# Patient Record
Sex: MALE | Race: BLACK OR AFRICAN AMERICAN | Employment: FULL TIME | URBAN - METROPOLITAN AREA
[De-identification: names, ages, dates, MRNs, and addresses within clinical notes are randomized per-mention and may not be internally consistent; named-entity substitution may affect disease eponyms.]

---

## 2017-01-06 VITALS — HEIGHT: 69 IN | WEIGHT: 173 LBS | BODY MASS INDEX: 25.62 KG/M2

## 2017-01-06 NOTE — PERIOP NOTES
Patient verified name, , and surgery as listed in [facility]. Type 1B surgery, phone assessment complete. Orders not received. Labs per surgeon: unknown; no orders received. Labs per anesthesia protocol: none needed per [facility] protocols. EKG: not needed per [facility] protocols. Patient was driving and rushing through assessment. Offered to call back at a more convenient time but patient said he wasn't going to have any other time to talk. Patient states he will try to call back and go over pre-op instructions when he can write things down. Call back number given. Called Dr. Erika Meyers office (pulmonologist) for most recent office note. [phone]. Patient answered medical/surgical history questions at their best of ability. All prior to admission medications documented in [facility]. Patient instructed to take the following medications the day of surgery according to anesthesia guidelines with a small sip of water: nexium, eye drops, spiriva inhaler. Instructed to bring inhaler dos. Hold all vitamins 7 days prior to surgery and NSAIDS 5 days prior to surgery. Medications to be held: none. Patient instructed on the following and verbalizes understanding:  Arrive at [place], time of arrival to be called the day before by 1700  NPO after midnight including gum, mints, and ice chips  Responsible adult must drive patient to the hospital, stay during surgery, and patient will need supervision 24 hours after anesthesia  Use antibacterial soap and hibiclens in shower the night before surgery and on the morning of surgery  Leave all valuables(money and jewelry) at home but bring insurance card and ID on DOS  Do not wear make-up, nail polish, lotions, cologne, perfumes, powders, or oil on skin.

## 2017-01-09 ENCOUNTER — HOSPITAL ENCOUNTER (OUTPATIENT)
Dept: SURGERY | Age: 52
Discharge: HOME | End: 2017-01-09

## 2017-01-11 PROBLEM — K40.20 BILATERAL INGUINAL HERNIA WITHOUT OBSTRUCTION OR GANGRENE: Status: ACTIVE | Noted: 2017-01-11

## 2017-01-12 NOTE — PERIOP NOTES
Spoke with patient and reviewed the following Pre Op instructions. Patient verified name, , and surgery as listed in [facility]. Patient instructed to take the following medications the day of surgery according to anesthesia guidelines with a small sip of water: Nexium, eye drops, spiriva inhaler. Hold all vitamins 7 days prior to surgery and NSAIDS 5 days prior to surgery. Medications to be held: None. Patient instructed on the following and verbalizes understanding:  Arrive at [facility], time of arrival to be called the day before by 1700  NPO after midnight including gum, mints, and ice chips  Responsible adult must drive patient to the hospital, stay during surgery, and patient will  need supervision 24 hours after anesthesia  Use Hibiclens in shower the night before surgery and on the morning of surgery  Leave all valuables(money and jewelry) at home but bring insurance card and ID on DOS  Do not wear make-up, nail polish, lotions, cologne, perfumes, powders, or oil on skin.

## 2017-01-12 NOTE — PERIOP NOTES
Still waiting on medical records to be faxed from [facility]. Called again ([phone]); left detailed message requesting most recent office note be faxed to pre assessment for anesthesia to review pre op.

## 2017-01-15 ENCOUNTER — ANESTHESIA EVENT (OUTPATIENT)
Dept: SURGERY | Age: 52
End: 2017-01-15
Payer: COMMERCIAL

## 2017-01-16 ENCOUNTER — ANESTHESIA (OUTPATIENT)
Dept: SURGERY | Age: 52
End: 2017-01-16
Payer: COMMERCIAL

## 2017-01-16 ENCOUNTER — SURGERY (OUTPATIENT)
Age: 52
End: 2017-01-16

## 2017-01-16 PROCEDURE — 74011250636 HC RX REV CODE- 250/636

## 2017-01-16 PROCEDURE — 77030008477 HC STYL SATN SLP COVD -A: Performed by: ANESTHESIOLOGY

## 2017-01-16 PROCEDURE — 74011250636 HC RX REV CODE- 250/636: Performed by: ANESTHESIOLOGY

## 2017-01-16 PROCEDURE — 77030020782 HC GWN BAIR PAWS FLX 3M -B: Performed by: ANESTHESIOLOGY

## 2017-01-16 PROCEDURE — 77030008703 HC TU ET UNCUF COVD -A: Performed by: ANESTHESIOLOGY

## 2017-01-16 PROCEDURE — 74011000250 HC RX REV CODE- 250

## 2017-01-16 RX ORDER — LIDOCAINE HYDROCHLORIDE 20 MG/ML
INJECTION, SOLUTION EPIDURAL; INFILTRATION; INTRACAUDAL; PERINEURAL AS NEEDED
Status: DISCONTINUED | OUTPATIENT
Start: 2017-01-16 | End: 2017-01-16 | Stop reason: HOSPADM

## 2017-01-16 RX ORDER — FENTANYL CITRATE 50 UG/ML
INJECTION, SOLUTION INTRAMUSCULAR; INTRAVENOUS AS NEEDED
Status: DISCONTINUED | OUTPATIENT
Start: 2017-01-16 | End: 2017-01-16 | Stop reason: HOSPADM

## 2017-01-16 RX ORDER — ONDANSETRON 2 MG/ML
INJECTION INTRAMUSCULAR; INTRAVENOUS AS NEEDED
Status: DISCONTINUED | OUTPATIENT
Start: 2017-01-16 | End: 2017-01-16 | Stop reason: HOSPADM

## 2017-01-16 RX ORDER — DEXAMETHASONE SODIUM PHOSPHATE 4 MG/ML
INJECTION, SOLUTION INTRA-ARTICULAR; INTRALESIONAL; INTRAMUSCULAR; INTRAVENOUS; SOFT TISSUE AS NEEDED
Status: DISCONTINUED | OUTPATIENT
Start: 2017-01-16 | End: 2017-01-16 | Stop reason: HOSPADM

## 2017-01-16 RX ORDER — ROCURONIUM BROMIDE 10 MG/ML
INJECTION, SOLUTION INTRAVENOUS AS NEEDED
Status: DISCONTINUED | OUTPATIENT
Start: 2017-01-16 | End: 2017-01-16 | Stop reason: HOSPADM

## 2017-01-16 RX ORDER — PROPOFOL 10 MG/ML
INJECTION, EMULSION INTRAVENOUS AS NEEDED
Status: DISCONTINUED | OUTPATIENT
Start: 2017-01-16 | End: 2017-01-16 | Stop reason: HOSPADM

## 2017-01-16 RX ORDER — SUCCINYLCHOLINE CHLORIDE 20 MG/ML
INJECTION INTRAMUSCULAR; INTRAVENOUS AS NEEDED
Status: DISCONTINUED | OUTPATIENT
Start: 2017-01-16 | End: 2017-01-16 | Stop reason: HOSPADM

## 2017-01-16 RX ADMIN — PROPOFOL 200 MG: 10 INJECTION, EMULSION INTRAVENOUS at 10:28

## 2017-01-16 RX ADMIN — ROCURONIUM BROMIDE 5 MG: 10 INJECTION, SOLUTION INTRAVENOUS at 10:28

## 2017-01-16 RX ADMIN — OXYMETAZOLINE HYDROCHLORIDE 3 SPRAY: 5 SPRAY NASAL at 11:13

## 2017-01-16 RX ADMIN — LIDOCAINE HYDROCHLORIDE 60 MG: 20 INJECTION, SOLUTION EPIDURAL; INFILTRATION; INTRACAUDAL; PERINEURAL at 10:28

## 2017-01-16 RX ADMIN — SUCCINYLCHOLINE CHLORIDE 140 MG: 20 INJECTION INTRAMUSCULAR; INTRAVENOUS at 10:28

## 2017-01-16 RX ADMIN — LIDOCAINE HYDROCHLORIDE AND EPINEPHRINE 5 ML: 10; 10 INJECTION, SOLUTION INFILTRATION; PERINEURAL at 11:13

## 2017-01-16 RX ADMIN — DEXAMETHASONE SODIUM PHOSPHATE 10 MG: 4 INJECTION, SOLUTION INTRA-ARTICULAR; INTRALESIONAL; INTRAMUSCULAR; INTRAVENOUS; SOFT TISSUE at 10:34

## 2017-01-16 RX ADMIN — ONDANSETRON 4 MG: 2 INJECTION INTRAMUSCULAR; INTRAVENOUS at 10:34

## 2017-01-16 RX ADMIN — SODIUM CHLORIDE, SODIUM LACTATE, POTASSIUM CHLORIDE, AND CALCIUM CHLORIDE: 600; 310; 30; 20 INJECTION, SOLUTION INTRAVENOUS at 10:40

## 2017-01-16 RX ADMIN — FENTANYL CITRATE 100 MCG: 50 INJECTION, SOLUTION INTRAMUSCULAR; INTRAVENOUS at 10:28

## 2017-01-16 NOTE — ANESTHESIA PREPROCEDURE EVALUATION
Anesthetic History   No history of anesthetic complications            Review of Systems / Medical History  Patient summary reviewed and pertinent labs reviewed    Pulmonary    COPD: mild      Smoker (25 pk years)         Neuro/Psych   Within defined limits           Cardiovascular                  Exercise tolerance: >4 METS  Comments: Denies CP, SOB or changes in functional status   GI/Hepatic/Renal     GERD: well controlled           Endo/Other        Arthritis     Other Findings              Physical Exam    Airway  Mallampati: II  TM Distance: 4 - 6 cm  Neck ROM: normal range of motion   Mouth opening: Normal     Cardiovascular    Rhythm: regular  Rate: normal         Dental    Dentition: Caps/crowns     Pulmonary  Breath sounds clear to auscultation               Abdominal  GI exam deferred       Other Findings            Anesthetic Plan    ASA: 2  Anesthesia type: general          Induction: Intravenous  Anesthetic plan and risks discussed with: Patient

## 2017-01-16 NOTE — ANESTHESIA POSTPROCEDURE EVALUATION
Post-Anesthesia Evaluation and Assessment    Patient: Aleena Bliss MRN: [id number]  SSN: [id number]    YOB: 1965  Age: 46 y.o. Sex: male       Cardiovascular Function/Vital Signs  Visit Vitals    /86    Pulse 66    Temp 36.8 °C (98.2 °F)    Resp 16    Ht 5' 9\" (1.753 m)    Wt 78.5 kg (173 lb)    SpO2 94%    BMI 25.55 kg/m2       Patient is status post general anesthesia for Procedure(s):  BILATERAL MAXILLARY/ BILATERAL FRONTAL/ BILATERAL SPENOID BALLOON SINUPLASTY WITH REMOVAL OF CONTENTS/ RIGHT NASAL POLYPECTOMY. Nausea/Vomiting: None    Postoperative hydration reviewed and adequate. Pain:  Pain Scale 1: Visual (01/16/17 1138)  Pain Intensity 1: 4 (01/16/17 1138)   Managed    Neurological Status:   Neuro (WDL): Within Defined Limits (01/16/17 0744)   At baseline    Mental Status and Level of Consciousness: Arousable    Pulmonary Status:   O2 Device: Room air (01/16/17 1152)   Adequate oxygenation and airway patent    Complications related to anesthesia: None    Post-anesthesia assessment completed.  No concerns    Signed By: Adarsh Coley MD     January 16, 2017

## 2017-03-01 ENCOUNTER — HOSPITAL ENCOUNTER (OUTPATIENT)
Dept: ULTRASOUND IMAGING | Age: 52
Discharge: HOME | End: 2017-03-01
Attending: NURSE PRACTITIONER
Payer: COMMERCIAL

## 2017-03-01 DIAGNOSIS — N43.3 HYDROCELE, RIGHT: ICD-10-CM

## 2017-03-01 DIAGNOSIS — N50.89 TESTICULAR SWELLING, RIGHT: ICD-10-CM

## 2017-03-01 PROCEDURE — 76870 US EXAM SCROTUM: CPT

## 2017-03-15 ENCOUNTER — HOSPITAL ENCOUNTER (OUTPATIENT)
Dept: SURGERY | Age: 52
Discharge: HOME | End: 2017-03-15

## 2017-03-15 VITALS
BODY MASS INDEX: 26.58 KG/M2 | WEIGHT: 179.44 LBS | SYSTOLIC BLOOD PRESSURE: 134 MMHG | RESPIRATION RATE: 16 BRPM | OXYGEN SATURATION: 97 % | HEIGHT: 69 IN | HEART RATE: 58 BPM | DIASTOLIC BLOOD PRESSURE: 90 MMHG | TEMPERATURE: 97.8 F

## 2017-03-15 PROBLEM — N43.3 HYDROCELE: Status: ACTIVE | Noted: 2017-03-15

## 2017-03-15 RX ORDER — ROSUVASTATIN CALCIUM 20 MG/1
20 TABLET, COATED ORAL DAILY
COMMUNITY
End: 2018-04-16 | Stop reason: SDUPTHER

## 2017-03-15 RX ORDER — HYDROCORTISONE 10 MG/1
5 TABLET ORAL EVERY EVENING
COMMUNITY

## 2017-03-15 RX ORDER — HYDROCORTISONE 10 MG/1
15 TABLET ORAL DAILY
COMMUNITY

## 2017-03-15 NOTE — PERIOP NOTES
Patient verified name, , and surgery as listed in [facility]. TYPE  CASE:1B  Orders per surgeon:  Received  Labs per surgeon:none  Labs per anesthesia protocol: none  EKG  : Attempting to obtain endocrinology records      Patient provided with handouts including guide to surgery , transfusions, pain management and hand hygiene for the family and community. Pt verbalizes understanding of all pre-op instructions . Instructed that family must be present in building at all times. Hibiclens and instructions given per hospital policy. Instructed patient to continue  previous medications as prescribed prior to surgery and  to take the following medications the day of surgery according to anesthesia guidelines : Crestor,Nexium,Cortef       Original medication prescription bottles where not visualized during patient appointment. Medication profile updated and reviewed with patient. Continue all previous medications unless otherwise directed. Instructed patient to hold  the following medications prior to surgery: none      Patient verbalized understanding of all instructions and provided all medical/health information to the best of their ability.

## 2017-03-20 ENCOUNTER — ANESTHESIA EVENT (OUTPATIENT)
Dept: SURGERY | Age: 52
End: 2017-03-20
Payer: COMMERCIAL

## 2017-03-21 ENCOUNTER — ANESTHESIA (OUTPATIENT)
Dept: SURGERY | Age: 52
End: 2017-03-21
Payer: COMMERCIAL

## 2017-03-21 ENCOUNTER — HOSPITAL ENCOUNTER (OUTPATIENT)
Age: 52
Setting detail: OUTPATIENT SURGERY
Discharge: HOME | End: 2017-03-21
Attending: UROLOGY | Admitting: UROLOGY
Payer: COMMERCIAL

## 2017-03-21 VITALS
OXYGEN SATURATION: 97 % | HEART RATE: 72 BPM | RESPIRATION RATE: 16 BRPM | SYSTOLIC BLOOD PRESSURE: 130 MMHG | WEIGHT: 175.5 LBS | DIASTOLIC BLOOD PRESSURE: 98 MMHG | TEMPERATURE: 98 F | BODY MASS INDEX: 26 KG/M2 | HEIGHT: 69 IN

## 2017-03-21 PROCEDURE — 74011250636 HC RX REV CODE- 250/636

## 2017-03-21 PROCEDURE — 76210000021 HC REC RM PH II 0.5 TO 1 HR: Performed by: UROLOGY

## 2017-03-21 PROCEDURE — 77030012411 HC DRN WND CARD -A: Performed by: UROLOGY

## 2017-03-21 PROCEDURE — 77030018836 HC SOL IRR NACL ICUM -A: Performed by: UROLOGY

## 2017-03-21 PROCEDURE — 74011250637 HC RX REV CODE- 250/637: Performed by: ANESTHESIOLOGY

## 2017-03-21 PROCEDURE — 77030011640 HC PAD GRND REM COVD -A: Performed by: UROLOGY

## 2017-03-21 PROCEDURE — 77030032490 HC SLV COMPR SCD KNE COVD -B: Performed by: UROLOGY

## 2017-03-21 PROCEDURE — 76210000063 HC OR PH I REC FIRST 0.5 HR: Performed by: UROLOGY

## 2017-03-21 PROCEDURE — 77030020782 HC GWN BAIR PAWS FLX 3M -B: Performed by: NURSE ANESTHETIST, CERTIFIED REGISTERED

## 2017-03-21 PROCEDURE — 74011000250 HC RX REV CODE- 250

## 2017-03-21 PROCEDURE — 76060000033 HC ANESTHESIA 1 TO 1.5 HR: Performed by: UROLOGY

## 2017-03-21 PROCEDURE — 76010000149 HC OR TIME 1 TO 1.5 HR: Performed by: UROLOGY

## 2017-03-21 PROCEDURE — 77030002888 HC SUT CHRMC J&J -A: Performed by: UROLOGY

## 2017-03-21 PROCEDURE — 77030020143 HC AIRWY LARYN INTUB CGAS -A: Performed by: NURSE ANESTHETIST, CERTIFIED REGISTERED

## 2017-03-21 PROCEDURE — 74011250636 HC RX REV CODE- 250/636: Performed by: UROLOGY

## 2017-03-21 PROCEDURE — 74011250636 HC RX REV CODE- 250/636: Performed by: ANESTHESIOLOGY

## 2017-03-21 PROCEDURE — 74011000250 HC RX REV CODE- 250: Performed by: UROLOGY

## 2017-03-21 RX ORDER — ACETAMINOPHEN 500 MG
1000 TABLET ORAL
Status: DISCONTINUED | OUTPATIENT
Start: 2017-03-21 | End: 2017-03-21 | Stop reason: HOSPADM

## 2017-03-21 RX ORDER — LIDOCAINE HYDROCHLORIDE 20 MG/ML
INJECTION, SOLUTION EPIDURAL; INFILTRATION; INTRACAUDAL; PERINEURAL AS NEEDED
Status: DISCONTINUED | OUTPATIENT
Start: 2017-03-21 | End: 2017-03-21 | Stop reason: HOSPADM

## 2017-03-21 RX ORDER — HYDROMORPHONE HYDROCHLORIDE 2 MG/ML
0.5 INJECTION, SOLUTION INTRAMUSCULAR; INTRAVENOUS; SUBCUTANEOUS
Status: DISCONTINUED | OUTPATIENT
Start: 2017-03-21 | End: 2017-03-21 | Stop reason: HOSPADM

## 2017-03-21 RX ORDER — HYDROCODONE BITARTRATE AND ACETAMINOPHEN 5; 325 MG/1; MG/1
1 TABLET ORAL AS NEEDED
Status: DISCONTINUED | OUTPATIENT
Start: 2017-03-21 | End: 2017-03-21 | Stop reason: HOSPADM

## 2017-03-21 RX ORDER — MIDAZOLAM HYDROCHLORIDE 1 MG/ML
2 INJECTION, SOLUTION INTRAMUSCULAR; INTRAVENOUS
Status: DISCONTINUED | OUTPATIENT
Start: 2017-03-21 | End: 2017-03-21 | Stop reason: HOSPADM

## 2017-03-21 RX ORDER — FENTANYL CITRATE 50 UG/ML
100 INJECTION, SOLUTION INTRAMUSCULAR; INTRAVENOUS ONCE
Status: DISCONTINUED | OUTPATIENT
Start: 2017-03-21 | End: 2017-03-21 | Stop reason: HOSPADM

## 2017-03-21 RX ORDER — LIDOCAINE HYDROCHLORIDE 10 MG/ML
0.1 INJECTION INFILTRATION; PERINEURAL AS NEEDED
Status: DISCONTINUED | OUTPATIENT
Start: 2017-03-21 | End: 2017-03-21 | Stop reason: HOSPADM

## 2017-03-21 RX ORDER — FENTANYL CITRATE 50 UG/ML
INJECTION, SOLUTION INTRAMUSCULAR; INTRAVENOUS AS NEEDED
Status: DISCONTINUED | OUTPATIENT
Start: 2017-03-21 | End: 2017-03-21 | Stop reason: HOSPADM

## 2017-03-21 RX ORDER — BUPIVACAINE HYDROCHLORIDE 5 MG/ML
INJECTION, SOLUTION EPIDURAL; INTRACAUDAL AS NEEDED
Status: DISCONTINUED | OUTPATIENT
Start: 2017-03-21 | End: 2017-03-21 | Stop reason: HOSPADM

## 2017-03-21 RX ORDER — PROPOFOL 10 MG/ML
INJECTION, EMULSION INTRAVENOUS AS NEEDED
Status: DISCONTINUED | OUTPATIENT
Start: 2017-03-21 | End: 2017-03-21 | Stop reason: HOSPADM

## 2017-03-21 RX ORDER — FAMOTIDINE 20 MG/1
20 TABLET, FILM COATED ORAL ONCE
Status: COMPLETED | OUTPATIENT
Start: 2017-03-21 | End: 2017-03-21

## 2017-03-21 RX ORDER — SODIUM CHLORIDE 0.9 % (FLUSH) 0.9 %
5-10 SYRINGE (ML) INJECTION EVERY 8 HOURS
Status: DISCONTINUED | OUTPATIENT
Start: 2017-03-21 | End: 2017-03-21 | Stop reason: HOSPADM

## 2017-03-21 RX ORDER — CEFAZOLIN SODIUM IN 0.9 % NACL 2 G/50 ML
2 INTRAVENOUS SOLUTION, PIGGYBACK (ML) INTRAVENOUS ONCE
Status: COMPLETED | OUTPATIENT
Start: 2017-03-21 | End: 2017-03-21

## 2017-03-21 RX ORDER — HYDROCORTISONE SODIUM SUCCINATE 100 MG/2ML
50 INJECTION, POWDER, FOR SOLUTION INTRAMUSCULAR; INTRAVENOUS ONCE
Status: COMPLETED | OUTPATIENT
Start: 2017-03-21 | End: 2017-03-21

## 2017-03-21 RX ORDER — SODIUM CHLORIDE 9 MG/ML
50 INJECTION, SOLUTION INTRAVENOUS CONTINUOUS
Status: DISCONTINUED | OUTPATIENT
Start: 2017-03-21 | End: 2017-03-21 | Stop reason: HOSPADM

## 2017-03-21 RX ORDER — ONDANSETRON 2 MG/ML
INJECTION INTRAMUSCULAR; INTRAVENOUS AS NEEDED
Status: DISCONTINUED | OUTPATIENT
Start: 2017-03-21 | End: 2017-03-21 | Stop reason: HOSPADM

## 2017-03-21 RX ORDER — SODIUM CHLORIDE, SODIUM LACTATE, POTASSIUM CHLORIDE, CALCIUM CHLORIDE 600; 310; 30; 20 MG/100ML; MG/100ML; MG/100ML; MG/100ML
150 INJECTION, SOLUTION INTRAVENOUS CONTINUOUS
Status: DISCONTINUED | OUTPATIENT
Start: 2017-03-21 | End: 2017-03-21 | Stop reason: HOSPADM

## 2017-03-21 RX ORDER — OXYCODONE AND ACETAMINOPHEN 7.5; 325 MG/1; MG/1
1 TABLET ORAL
Qty: 30 TAB | Refills: 0 | Status: SHIPPED | OUTPATIENT
Start: 2017-03-21 | End: 2018-04-16 | Stop reason: ALTCHOICE

## 2017-03-21 RX ORDER — GLYCOPYRROLATE 0.2 MG/ML
INJECTION INTRAMUSCULAR; INTRAVENOUS AS NEEDED
Status: DISCONTINUED | OUTPATIENT
Start: 2017-03-21 | End: 2017-03-21 | Stop reason: HOSPADM

## 2017-03-21 RX ORDER — OXYCODONE HYDROCHLORIDE 5 MG/1
5 TABLET ORAL ONCE
Status: COMPLETED | OUTPATIENT
Start: 2017-03-21 | End: 2017-03-21

## 2017-03-21 RX ORDER — CIPROFLOXACIN 500 MG/1
500 TABLET ORAL 2 TIMES DAILY
Qty: 10 TAB | Refills: 0 | Status: SHIPPED | OUTPATIENT
Start: 2017-03-21 | End: 2017-03-26

## 2017-03-21 RX ORDER — SODIUM CHLORIDE 0.9 % (FLUSH) 0.9 %
5-10 SYRINGE (ML) INJECTION AS NEEDED
Status: DISCONTINUED | OUTPATIENT
Start: 2017-03-21 | End: 2017-03-21 | Stop reason: HOSPADM

## 2017-03-21 RX ORDER — FAMOTIDINE 10 MG/ML
20 INJECTION INTRAVENOUS ONCE
Status: DISCONTINUED | OUTPATIENT
Start: 2017-03-21 | End: 2017-03-21 | Stop reason: HOSPADM

## 2017-03-21 RX ADMIN — CEFAZOLIN 2 G: 1 INJECTION, POWDER, FOR SOLUTION INTRAMUSCULAR; INTRAVENOUS; PARENTERAL at 16:05

## 2017-03-21 RX ADMIN — FAMOTIDINE 20 MG: 20 TABLET, FILM COATED ORAL at 13:29

## 2017-03-21 RX ADMIN — ONDANSETRON 4 MG: 2 INJECTION INTRAMUSCULAR; INTRAVENOUS at 16:46

## 2017-03-21 RX ADMIN — MIDAZOLAM HYDROCHLORIDE 2 MG: 1 INJECTION, SOLUTION INTRAMUSCULAR; INTRAVENOUS at 14:39

## 2017-03-21 RX ADMIN — FENTANYL CITRATE 50 MCG: 50 INJECTION, SOLUTION INTRAMUSCULAR; INTRAVENOUS at 16:12

## 2017-03-21 RX ADMIN — GLYCOPYRROLATE 0.1 MG: 0.2 INJECTION INTRAMUSCULAR; INTRAVENOUS at 16:13

## 2017-03-21 RX ADMIN — PROPOFOL 200 MG: 10 INJECTION, EMULSION INTRAVENOUS at 16:12

## 2017-03-21 RX ADMIN — SODIUM CHLORIDE, SODIUM LACTATE, POTASSIUM CHLORIDE, AND CALCIUM CHLORIDE 150 ML/HR: 600; 310; 30; 20 INJECTION, SOLUTION INTRAVENOUS at 13:29

## 2017-03-21 RX ADMIN — FENTANYL CITRATE 50 MCG: 50 INJECTION, SOLUTION INTRAMUSCULAR; INTRAVENOUS at 16:25

## 2017-03-21 RX ADMIN — LIDOCAINE HYDROCHLORIDE 80 MG: 20 INJECTION, SOLUTION EPIDURAL; INFILTRATION; INTRACAUDAL; PERINEURAL at 16:12

## 2017-03-21 RX ADMIN — PROPOFOL 100 MG: 10 INJECTION, EMULSION INTRAVENOUS at 16:14

## 2017-03-21 RX ADMIN — HYDROCORTISONE SODIUM SUCCINATE 50 MG: 100 INJECTION, POWDER, FOR SOLUTION INTRAMUSCULAR; INTRAVENOUS at 14:39

## 2017-03-21 RX ADMIN — OXYCODONE HYDROCHLORIDE 5 MG: 5 TABLET ORAL at 17:50

## 2017-03-21 NOTE — DISCHARGE INSTRUCTIONS
Follow-Up on Friday Morning for drain removal  (office will call to schedule appointment)  Resume diet  Ice pack for 6 hrs    Shower daily starting tomorrow.    Change gauze as needed and daily.    Limit activity  For 10 -14 days     Hydrocelectomy: What to Expect at [facility] is surgery to remove a hydrocele. A hydrocele is a fluid-filled sac inside the scrotum. A hydrocele happens when there is an opening between the belly and the scrotum. This allows fluid to pass back and forth between the belly and the scrotum and causes the scrotum to fill with fluid. A hydrocele can happen on one or both sides of the scrotum. The doctor made a very small cut (incision) in your groin to drain the fluid inside the hydrocele and remove the fluid-filled sac inside the scrotum. After surgery, there will no longer be an opening between your belly and the scrotum. This will stop fluid from building up in the scrotum. You may feel more tired than usual and have some mild groin pain for several days after surgery. Your groin and scrotum may be swollen or bruised. This usually gets better in 2 to 3 weeks. You will probably be able to go back to work or school 4 to 7 days after surgery. But you will need to avoid strenuous exercise or heavy lifting for 2 to 4 weeks. This care sheet gives you a general idea about how long it will take for you to recover. But each person recovers at a different pace. Follow the steps below to get better as quickly as possible. How can you care for yourself at home? Activity  · Rest when you feel tired. Getting enough sleep will help you recover. · Try to walk each day. Start by walking a little more than you did the day before. Bit by bit, increase the amount you walk. Walking boosts blood flow and helps prevent pneumonia and constipation. · You may shower 24 hours after surgery, if your doctor says it is okay. Pat the cut (incision) dry.  Do not take a bath for the first week, or until your doctor tells you it is okay. · You may return to work or school when you are ready. This is usually in about 4 to 7 days. · Avoid strenuous activities, such as bicycle riding, jogging, weight lifting, or aerobic exercise, until your doctor says it is okay. · For 2 to 4 weeks, avoid lifting anything that would make you strain. This may include heavy grocery bags and milk containers, a heavy briefcase or backpack, cat litter or dog food bags, a vacuum , or a child. Diet  · You can eat your normal diet. If your stomach is upset, try bland, low-fat foods like plain rice, broiled chicken, toast, and yogurt. · Drink plenty of fluids to avoid becoming dehydrated. · You may notice that your bowel movements are not regular right after your surgery. This is common. Try to avoid constipation and straining with bowel movements. You may want to take a fiber supplement every day. If you have not had a bowel movement after a couple of days, ask your doctor about taking a mild laxative. Medicines  · Your doctor will tell you if and when you can restart your medicines. He or she will also give you instructions about taking any new medicines. · If you take blood thinners, such as warfarin (Coumadin), clopidogrel (Plavix), or aspirin, be sure to talk to your doctor. He or she will tell you if and when to start taking those medicines again. Make sure that you understand exactly what your doctor wants you to do. · Take pain medicines exactly as directed. ¨ If the doctor gave you a prescription medicine for pain, take it as prescribed. ¨ If you are not taking a prescription pain medicine, ask your doctor if you can take an over-the-counter medicine. · If you think pain medicine is making you sick to your stomach:  ¨ Take your medicine after meals (unless the doctor has told you not to). ¨ Ask your doctor for a different pain medicine. · If your doctor prescribed antibiotics, take them as directed. Do not stop taking them just because you feel better. You need to take the full course of antibiotics. Incision care  · If you have strips of tape on the cut (incision) the doctor made, leave the tape on for a week or until it falls off. · Wash the area daily with warm, soapy water, and pat it dry. Don't use hydrogen peroxide or alcohol, which can slow healing. You may cover the area with a gauze bandage if it weeps or rubs against clothing. Change the bandage every day. · Keep the area clean and dry. Follow-up care is a key part of your treatment and safety. Be sure to make and go to all appointments, and call your doctor if you are having problems. It's also a good idea to know your test results and keep a list of the medicines you take. When should you call for help? Call 911 anytime you think you may need emergency care. For example, call if:  · You passed out (lost consciousness). · You have severe trouble breathing. · You have sudden chest pain and shortness of breath, or you cough up blood. Call your doctor now or seek immediate medical care if:  · You are sick to your stomach or cannot keep fluids down. · You have pain that does not get better after you take pain medicine. · You have a fever over 100.4 F.  · You have loose stitches, or your incision comes open. · Bright red blood has soaked through the bandage over your incision. · Your scrotum gets more swollen. · You have signs of infection, such as:  ¨ Increased pain, swelling, warmth, or redness. ¨ Red streaks leading from the incision. ¨ Pus draining from the incision. ¨ Swollen lymph nodes in your groin, neck, or armpits. Watch closely for changes in your health, and be sure to contact your doctor if you have any problems.   After general anesthesia or intravenous sedation, for 24 hours or while taking prescription Narcotics:  · Limit your activities  · Do not drive and operate hazardous machinery  · Do not make important personal or business decisions  · Do  not drink alcoholic beverages  · If you have not urinated within 8 hours after discharge, please contact your surgeon on call. *  Please give a list of your current medications to your Primary Care Provider. *  Please update this list whenever your medications are discontinued, doses are      changed, or new medications (including over-the-counter products) are added. *  Please carry medication information at all times in case of emergency situations. These are general instructions for a healthy lifestyle:  No smoking/ No tobacco products/ Avoid exposure to second hand smoke  Surgeon General's Warning:  Quitting smoking now greatly reduces serious risk to your health. Obesity, smoking, and sedentary lifestyle greatly increases your risk for illness  A healthy diet, regular physical exercise & weight monitoring are important for maintaining a healthy lifestyle    You may be retaining fluid if you have a history of heart failure or if you experience any of the following symptoms:  Weight gain of 3 pounds or more overnight or 5 pounds in a week, increased swelling in our hands or feet or shortness of breath while lying flat in bed. Please call your doctor as soon as you notice any of these symptoms; do not wait until your next office visit. Recognize signs and symptoms of STROKE:    F-face looks uneven    A-arms unable to move or move unevenly    S-speech slurred or non-existent    T-time-call 911 as soon as signs and symptoms begin-DO NOT go       Back to bed or wait to see if you get better-TIME IS BRAIN.

## 2017-03-21 NOTE — ANESTHESIA PREPROCEDURE EVALUATION
Anesthetic History   No history of anesthetic complications            Review of Systems / Medical History  Patient summary reviewed, nursing notes reviewed and pertinent labs reviewed    Pulmonary    COPD: mild      Smoker (25 pk years, quit 13 years.)         Neuro/Psych   Within defined limits           Cardiovascular              Hyperlipidemia    Exercise tolerance: >4 METS  Comments: Denies CP, SOB or changes in functional status   GI/Hepatic/Renal     GERD: well controlled           Endo/Other        Arthritis    Comments: Pituitary mass , being watched, also adrenal insuff. On cortef.  Other Findings              Physical Exam    Airway  Mallampati: II  TM Distance: 4 - 6 cm  Neck ROM: normal range of motion   Mouth opening: Normal     Cardiovascular    Rhythm: regular  Rate: normal         Dental         Pulmonary  Breath sounds clear to auscultation               Abdominal  GI exam deferred       Other Findings            Anesthetic Plan    ASA: 2  Anesthesia type: general          Induction: Intravenous  Anesthetic plan and risks discussed with: Patient and Spouse

## 2017-03-21 NOTE — IP AVS SNAPSHOT
[address] [address] 
[phone] Patient: Pedro Steven MRN: [id number] LANEY:2[birth year]76[birth year] You are allergic to the following No active allergies Recent Documentation Height Weight BMI Smoking Status 1.753 m 79.6 kg 25.92 kg/m2 Former Smoker Emergency Contacts Name Discharge Info Relation Home Work Mobile Yasemin Willett  Spouse [3] [phone] About your hospitalization You were admitted on:  March 21, 2017 [facility] You were discharged on:  March 21, 2017 Unit phone number:  [phone] Why you were hospitalized Your primary diagnosis was:  Not on File Providers Seen During Your Hospitalizations Provider Role Specialty Primary office phone Fernando Mirza MD Attending Provider Urology [phone] Your Primary Care Physician (PCP) Primary Care Physician Office Phone Office Fax Chelly Indra [phone] Follow-up Information Follow up With Details Comments Contact Info Fernando Mirza MD Schedule an appointment as soon as possible for a visit on 3/24/2017  [address] [address] 
[phone] Your Appointments Wednesday March 29, 2017  9:25 AM EDT Office Visit with [facility] [address] [place] [address] - [facility]) [address] [facility] [address] [place] [address]  [phone] Current Discharge Medication List  
  
START taking these medications Dose & Instructions Dispensing Information Comments Morning Noon Evening Bedtime  
 ciprofloxacin HCl 500 mg tablet Commonly known as:  CIPRO Your last dose was: Your next dose is:    
   
   
 Dose:  500 mg Take 1 Tab by mouth two (2) times a day for 5 days. Quantity:  10 Tab Refills:  0 oxyCODONE-acetaminophen 7.5-325 mg per tablet Commonly known as:  PERCOCET 7.5 Your last dose was: Your next dose is:    
   
   
 Dose:  1 Tab Take 1 Tab by mouth every four (4) hours as needed for Pain. Max Daily Amount: 6 Tabs. Quantity:  30 Tab Refills:  0 CONTINUE these medications which have NOT CHANGED Dose & Instructions Dispensing Information Comments Morning Noon Evening Bedtime  
 brimonidine 0.2 % ophthalmic solution Commonly known as:  Emma Carreon Your last dose was: Your next dose is:    
   
   
 INSTILL 1 DROP INTO BOTH EYES TWICE A DAY Refills:  4  
     
   
   
   
  
 * CORTEF 10 mg tablet Generic drug:  hydrocortisone Your last dose was: Your next dose is:    
   
   
 Dose:  15 mg Take 15 mg by mouth daily. Refills:  0  
     
   
   
   
  
 * CORTEF 10 mg tablet Generic drug:  hydrocortisone Your last dose was: Your next dose is:    
   
   
 Dose:  5 mg Take 5 mg by mouth every evening. Refills:  0  
     
   
   
   
  
 CRESTOR 20 mg tablet Generic drug:  rosuvastatin Your last dose was: Your next dose is:    
   
   
 Dose:  20 mg Take 20 mg by mouth daily. Refills:  0 NexIUM 20 mg capsule Generic drug:  esomeprazole Your last dose was: Your next dose is:    
   
   
 Dose:  20 mg Take 20 mg by mouth daily. Take / use AM day of surgery  per anesthesia protocols. Indications: GASTROESOPHAGEAL REFLUX Refills:  0 SPIRIVA WITH HANDIHALER 18 mcg inhalation capsule Generic drug:  tiotropium Your last dose was: Your next dose is:    
   
   
 Dose:  1 Cap Take 1 Cap by inhalation daily. Refills:  0  
     
   
   
   
  
 zolpidem CR 12.5 mg tablet Commonly known as:  AMBIEN CR Your last dose was:     
   
Your next dose is:    
   
   
 Dose:  12.5 mg  
 Take 1 Tab by mouth nightly as needed for Sleep. Max Daily Amount: 12.5 mg.  
 Quantity:  30 Tab Refills:  5  
     
   
   
   
  
 * Notice: This list has 2 medication(s) that are the same as other medications prescribed for you. Read the directions carefully, and ask your doctor or other care provider to review them with you. Where to Get Your Medications These medications were sent to [facility]/pharmacy #[id number]- [place], [address] Phone:  [phone]  
  ciprofloxacin HCl 500 mg tablet Information on where to get these meds will be given to you by the nurse or doctor. ! Ask your nurse or doctor about these medications  
  oxyCODONE-acetaminophen 7.5-325 mg per tablet Discharge Instructions Follow-Up on Friday Morning for drain removal  (office will call to schedule appointment) Resume diet  Ice pack for 6 hrs   
Shower daily starting tomorrow.   
Change gauze as needed and daily.   
Limit activity  For 10 -14 days Hydrocelectomy: What to Expect at [facility] Your Recovery Hydrocelectomy is surgery to remove a hydrocele. A hydrocele is a fluid-filled sac inside the scrotum. A hydrocele happens when there is an opening between the belly and the scrotum. This allows fluid to pass back and forth between the belly and the scrotum and causes the scrotum to fill with fluid. A hydrocele can happen on one or both sides of the scrotum. The doctor made a very small cut (incision) in your groin to drain the fluid inside the hydrocele and remove the fluid-filled sac inside the scrotum. After surgery, there will no longer be an opening between your belly and the scrotum. This will stop fluid from building up in the scrotum. You may feel more tired than usual and have some mild groin pain for several days after surgery.  Your groin and scrotum may be swollen or bruised. This usually gets better in 2 to 3 weeks. You will probably be able to go back to work or school 4 to 7 days after surgery. But you will need to avoid strenuous exercise or heavy lifting for 2 to 4 weeks. This care sheet gives you a general idea about how long it will take for you to recover. But each person recovers at a different pace. Follow the steps below to get better as quickly as possible. How can you care for yourself at home? Activity · Rest when you feel tired. Getting enough sleep will help you recover. · Try to walk each day. Start by walking a little more than you did the day before. Bit by bit, increase the amount you walk. Walking boosts blood flow and helps prevent pneumonia and constipation. · You may shower 24 hours after surgery, if your doctor says it is okay. Pat the cut (incision) dry. Do not take a bath for the first week, or until your doctor tells you it is okay. · You may return to work or school when you are ready. This is usually in about 4 to 7 days. · Avoid strenuous activities, such as bicycle riding, jogging, weight lifting, or aerobic exercise, until your doctor says it is okay. · For 2 to 4 weeks, avoid lifting anything that would make you strain. This may include heavy grocery bags and milk containers, a heavy briefcase or backpack, cat litter or dog food bags, a vacuum , or a child. Diet · You can eat your normal diet. If your stomach is upset, try bland, low-fat foods like plain rice, broiled chicken, toast, and yogurt. · Drink plenty of fluids to avoid becoming dehydrated. · You may notice that your bowel movements are not regular right after your surgery. This is common. Try to avoid constipation and straining with bowel movements. You may want to take a fiber supplement every day. If you have not had a bowel movement after a couple of days, ask your doctor about taking a mild laxative. Medicines · Your doctor will tell you if and when you can restart your medicines. He or she will also give you instructions about taking any new medicines. · If you take blood thinners, such as warfarin (Coumadin), clopidogrel (Plavix), or aspirin, be sure to talk to your doctor. He or she will tell you if and when to start taking those medicines again. Make sure that you understand exactly what your doctor wants you to do. · Take pain medicines exactly as directed. ¨ If the doctor gave you a prescription medicine for pain, take it as prescribed. ¨ If you are not taking a prescription pain medicine, ask your doctor if you can take an over-the-counter medicine. · If you think pain medicine is making you sick to your stomach: 
¨ Take your medicine after meals (unless the doctor has told you not to). ¨ Ask your doctor for a different pain medicine. · If your doctor prescribed antibiotics, take them as directed. Do not stop taking them just because you feel better. You need to take the full course of antibiotics. Incision care · If you have strips of tape on the cut (incision) the doctor made, leave the tape on for a week or until it falls off. · Wash the area daily with warm, soapy water, and pat it dry. Don't use hydrogen peroxide or alcohol, which can slow healing. You may cover the area with a gauze bandage if it weeps or rubs against clothing. Change the bandage every day. · Keep the area clean and dry. Follow-up care is a key part of your treatment and safety. Be sure to make and go to all appointments, and call your doctor if you are having problems. It's also a good idea to know your test results and keep a list of the medicines you take. When should you call for help? Call 911 anytime you think you may need emergency care. For example, call if: 
· You passed out (lost consciousness). · You have severe trouble breathing. · You have sudden chest pain and shortness of breath, or you cough up blood. Call your doctor now or seek immediate medical care if: 
· You are sick to your stomach or cannot keep fluids down. · You have pain that does not get better after you take pain medicine. · You have a fever over 100.4 F. 
· You have loose stitches, or your incision comes open. · Bright red blood has soaked through the bandage over your incision. · Your scrotum gets more swollen. · You have signs of infection, such as: 
¨ Increased pain, swelling, warmth, or redness. ¨ Red streaks leading from the incision. ¨ Pus draining from the incision. ¨ Swollen lymph nodes in your groin, neck, or armpits. Watch closely for changes in your health, and be sure to contact your doctor if you have any problems. After general anesthesia or intravenous sedation, for 24 hours or while taking prescription Narcotics: · Limit your activities · Do not drive and operate hazardous machinery · Do not make important personal or business decisions · Do  not drink alcoholic beverages · If you have not urinated within 8 hours after discharge, please contact your surgeon on call. *  Please give a list of your current medications to your Primary Care Provider. *  Please update this list whenever your medications are discontinued, doses are 
    changed, or new medications (including over-the-counter products) are added. *  Please carry medication information at all times in case of emergency situations. These are general instructions for a healthy lifestyle: No smoking/ No tobacco products/ Avoid exposure to second hand smoke Surgeon General's Warning:  Quitting smoking now greatly reduces serious risk to your health. Obesity, smoking, and sedentary lifestyle greatly increases your risk for illness A healthy diet, regular physical exercise & weight monitoring are important for maintaining a healthy lifestyle You may be retaining fluid if you have a history of heart failure or if you experience any of the following symptoms:  Weight gain of 3 pounds or more overnight or 5 pounds in a week, increased swelling in our hands or feet or shortness of breath while lying flat in bed. Please call your doctor as soon as you notice any of these symptoms; do not wait until your next office visit. Recognize signs and symptoms of STROKE: 
 
F-face looks uneven A-arms unable to move or move unevenly S-speech slurred or non-existent T-time-call 911 as soon as signs and symptoms begin-DO NOT go Back to bed or wait to see if you get better-TIME IS BRAIN. Discharge Orders None Introducing [facility]! Deshawn Miranda introduces [employer] patient portal. Now you can access parts of your medical record, email your doctor's office, and request medication refills online. 1. In your internet browser, go to [email or web address] [email or web address] 2. Click on the First Time User? Click Here link in the Sign In box. You will see the New Member Sign Up page. 3. Enter your [employer] Access Code exactly as it appears below. You will not need to use this code after youve completed the sign-up process. If you do not sign up before the expiration date, you must request a new code. · [employer] Access Code: [email or web address] Expires: 6/5/2017  4:55 PM 
 
4. Enter the last four digits of your Social Security Number (xxxx) and Date of Birth (mm/dd/yyyy) as indicated and click Submit. You will be taken to the next sign-up page. 5. Create a [employer] ID. This will be your [employer] login ID and cannot be changed, so think of one that is secure and easy to remember. 6. Create a [employer] password. You can change your password at any time. 7. Enter your Password Reset Question and Answer. This can be used at a later time if you forget your password. 8. Enter your e-mail address. You will receive e-mail notification when new information is available in [address] 9. Click Sign Up. You can now view and download portions of your medical record. 10. Click the Download Summary menu link to download a portable copy of your medical information. If you have questions, please visit the Frequently Asked Questions section of the [employer] website. Remember, [employer] is NOT to be used for urgent needs. For medical emergencies, dial 911. Now available from your iPhone and Android! General Information Please provide this summary of care documentation to your next provider. Patient Signature:  ____________________________________________________________ Date:  ____________________________________________________________  
  
James Borrero Provider Signature:  ____________________________________________________________ Date:  ____________________________________________________________

## 2017-03-21 NOTE — ANESTHESIA POSTPROCEDURE EVALUATION
Post-Anesthesia Evaluation and Assessment    Patient: Alvina Ochoa MRN: [id number]  SSN: [id number]    YOB: 1965  Age: 46 y.o. Sex: male       Cardiovascular Function/Vital Signs  Visit Vitals    BP (!) 131/97    Pulse 72    Temp 36.8 °C (98.2 °F)    Resp 16    Ht 5' 9\" (1.753 m)    Wt 79.6 kg (175 lb 8 oz)    SpO2 100%    BMI 25.92 kg/m2       Patient is status post general anesthesia for Procedure(s): HYDROCELECTOMY. Nausea/Vomiting: None    Postoperative hydration reviewed and adequate. Pain:  Pain Scale 1: Visual (03/21/17 1711)  Pain Intensity 1: 0 (03/21/17 1711)   Managed    Neurological Status:   Neuro (WDL): Exceptions to WDL (03/21/17 1711)  Neuro  Neurologic State: Drowsy;Sleeping (03/21/17 1711)   At baseline    Mental Status and Level of Consciousness: Arousable    Pulmonary Status:   O2 Device: Nasal cannula (03/21/17 1711)   Adequate oxygenation and airway patent    Complications related to anesthesia: None    Post-anesthesia assessment completed.  No concerns    Signed By: Danny Fiore MD     March 21, 2017

## 2017-03-21 NOTE — BRIEF OP NOTE
BRIEF OPERATIVE NOTE    Date of Procedure: 3/21/2017   Preoperative Diagnosis: Spermatocele [N43.40]  Testicular swelling [N50.89]  Postoperative Diagnosis: Spermatocele [N43.40]  Testicular swelling [N50.89]    Procedure(s):   HYDROCELECTOMY  Surgeon(s) and Role:     * Henri De MD - Primary            Surgical Staff:  Circ-1: Myrna Blackburn RN  Scrub Tech-1: Britney Munoz  Event Time In   Incision Start 1636   Incision Close 1656     Anesthesia: General   Estimated Blood Loss: min  Specimens: * No specimens in log *   Findings:    Complications:   Implants: * No implants in log *

## 2017-03-22 NOTE — OP NOTES
[facility]   OPERATIVE REPORT       Name:  Milli Durand   MR#:  [id number]   :  1965   Account #:  [de-identified]   Date of Adm:  2017       DATE OF PROCEDURE: 2017. PREOPERATIVE DIAGNOSIS: Right scrotal hydrocele. POSTOPERATIVE DIAGNOSIS: Right scrotal hydrocele. NAME OF PROCEDURE: Right hydrocelectomy. SURGEON: VINCENT Bobo MD.    ANESTHESIA: General.    BLOOD LOSS: Minimal.    OPERATIVE PROCEDURE: The patient was taken to the operating room   suite, underwent general anesthesia. He was shaved, prepped with   Betadine and draped in appropriate manner. Transverse right   scrotal incision was made down through the skin and the dartos   fascia, dissecting the fascia off the tunica vaginalis and then   the hydrocele sac was brought up into the operative field. It   was opened anteriorly and drained and had about 275 mL of straw-  colored fluid. The redundant hydrocele sac was excised using   electrocautery and there was good hemostasis. The patient had   the cord blocked with 10 mL of 0.5% Marcaine without epinephrine   and then the patient had the skin edges anesthetized. The   dependent portion of the scrotum was incised and a Penrose drain   was brought out and placed into the right hemiscrotum and   sutured at the skin with a 3-0 chromic. The patient then had the   testicle placed in its anatomic position. The skin edges were   reapproximated with a running suture to approximate the scrotal   fascia and then the skin edges were approximated with vertical   mattress sutures of 3-0 chromic. The patient tolerated the   procedure well, was sent to the recovery area in stable   condition with a scrotal supporter and fluffs. He would keep the   drain from Tuesday until Friday morning. He will come in the   office, have that out and then followup with me in 2 weeks. He   was sent home on 5 days of Cipro and Percocet for pain.         MD Hai Cali / RAFAEL   D: 03/21/2017   17:04   T:  03/22/2017   11:13   Job #:  [id number]

## (undated) DEVICE — BUTTON SWITCH PENCIL BLADE ELECTRODE, HOLSTER: Brand: EDGE

## (undated) DEVICE — NEEDLE HYPO 25GA L1.5IN BLU POLYPR HUB S STL REG BVL STR

## (undated) DEVICE — SOLUTION IV 1000ML 0.9% SOD CHL

## (undated) DEVICE — KENDALL SCD EXPRESS SLEEVES, KNEE LENGTH, MEDIUM: Brand: KENDALL SCD

## (undated) DEVICE — SUT CHRMC 4-0 27IN SH BRN --

## (undated) DEVICE — SUT CHRMC 3-0 27IN SH BRN --

## (undated) DEVICE — ATHLETIC SUPPORTER LATEX FREE, LARGE

## (undated) DEVICE — SHEET, T, LAPAROTOMY, STERILE: Brand: MEDLINE

## (undated) DEVICE — TRAY PREP DRY W/ PREM GLV 2 APPL 6 SPNG 2 UNDPD 1 OVERWRAP

## (undated) DEVICE — PENROSE DRAIN 12" X 1/4: Brand: CARDINAL HEALTH

## (undated) DEVICE — SURGICAL PROCEDURE PACK BASIC ST FRANCIS

## (undated) DEVICE — BLADE ASSEMB CLP HAIR FINE --

## (undated) DEVICE — REM POLYHESIVE ADULT PATIENT RETURN ELECTRODE: Brand: VALLEYLAB

## (undated) DEVICE — SUPPORT SCROT L FOR 39-44IN WAIST NYL CONSTANT COMFORTABLE

## (undated) DEVICE — AMD ANTIMICROBIAL GAUZE SPONGES,12 PLY USP TYPE VII, 0.2% POLYHEXAMETHYLENE BIGUANIDE HCI (PHMB): Brand: CURITY